# Patient Record
Sex: MALE | Race: OTHER | HISPANIC OR LATINO | URBAN - METROPOLITAN AREA
[De-identification: names, ages, dates, MRNs, and addresses within clinical notes are randomized per-mention and may not be internally consistent; named-entity substitution may affect disease eponyms.]

---

## 2018-02-11 ENCOUNTER — EMERGENCY (EMERGENCY)
Facility: HOSPITAL | Age: 23
LOS: 1 days | Discharge: HOME | End: 2018-02-11
Attending: EMERGENCY MEDICINE
Payer: MEDICAID

## 2018-02-11 VITALS — HEIGHT: 68 IN | WEIGHT: 179.9 LBS

## 2018-02-11 VITALS — HEART RATE: 98 BPM

## 2018-02-11 LAB
ALBUMIN SERPL ELPH-MCNC: 4.1 G/DL — SIGNIFICANT CHANGE UP (ref 3.3–5.2)
ALP SERPL-CCNC: 97 U/L — SIGNIFICANT CHANGE UP (ref 40–120)
ALT FLD-CCNC: 33 U/L — SIGNIFICANT CHANGE UP
ANION GAP SERPL CALC-SCNC: 14 MMOL/L — SIGNIFICANT CHANGE UP (ref 5–17)
AST SERPL-CCNC: 21 U/L — SIGNIFICANT CHANGE UP
BASOPHILS # BLD AUTO: 0 K/UL — SIGNIFICANT CHANGE UP (ref 0–0.2)
BASOPHILS NFR BLD AUTO: 0.3 % — SIGNIFICANT CHANGE UP (ref 0–2)
BILIRUB SERPL-MCNC: 0.3 MG/DL — LOW (ref 0.4–2)
BUN SERPL-MCNC: 13 MG/DL — SIGNIFICANT CHANGE UP (ref 8–20)
CALCIUM SERPL-MCNC: 9.2 MG/DL — SIGNIFICANT CHANGE UP (ref 8.6–10.2)
CHLORIDE SERPL-SCNC: 100 MMOL/L — SIGNIFICANT CHANGE UP (ref 98–107)
CO2 SERPL-SCNC: 24 MMOL/L — SIGNIFICANT CHANGE UP (ref 22–29)
CREAT SERPL-MCNC: 0.95 MG/DL — SIGNIFICANT CHANGE UP (ref 0.5–1.3)
EOSINOPHIL # BLD AUTO: 1.5 K/UL — HIGH (ref 0–0.5)
EOSINOPHIL NFR BLD AUTO: 9.9 % — HIGH (ref 0–6)
GLUCOSE SERPL-MCNC: 100 MG/DL — SIGNIFICANT CHANGE UP (ref 70–115)
HCT VFR BLD CALC: 41.6 % — LOW (ref 42–52)
HGB BLD-MCNC: 14.2 G/DL — SIGNIFICANT CHANGE UP (ref 14–18)
LACTATE BLDV-MCNC: 0.7 MMOL/L — SIGNIFICANT CHANGE UP (ref 0.5–2)
LYMPHOCYTES # BLD AUTO: 21.4 % — SIGNIFICANT CHANGE UP (ref 20–55)
LYMPHOCYTES # BLD AUTO: 3.3 K/UL — SIGNIFICANT CHANGE UP (ref 1–4.8)
MCHC RBC-ENTMCNC: 29.5 PG — SIGNIFICANT CHANGE UP (ref 27–31)
MCHC RBC-ENTMCNC: 34.1 G/DL — SIGNIFICANT CHANGE UP (ref 32–36)
MCV RBC AUTO: 86.5 FL — SIGNIFICANT CHANGE UP (ref 80–94)
MONOCYTES # BLD AUTO: 1.3 K/UL — HIGH (ref 0–0.8)
MONOCYTES NFR BLD AUTO: 8.6 % — SIGNIFICANT CHANGE UP (ref 3–10)
NEUTROPHILS # BLD AUTO: 9.2 K/UL — HIGH (ref 1.8–8)
NEUTROPHILS NFR BLD AUTO: 59.6 % — SIGNIFICANT CHANGE UP (ref 37–73)
PLATELET # BLD AUTO: 267 K/UL — SIGNIFICANT CHANGE UP (ref 150–400)
POTASSIUM SERPL-MCNC: 3.9 MMOL/L — SIGNIFICANT CHANGE UP (ref 3.5–5.3)
POTASSIUM SERPL-SCNC: 3.9 MMOL/L — SIGNIFICANT CHANGE UP (ref 3.5–5.3)
PROT SERPL-MCNC: 7.9 G/DL — SIGNIFICANT CHANGE UP (ref 6.6–8.7)
RBC # BLD: 4.81 M/UL — SIGNIFICANT CHANGE UP (ref 4.6–6.2)
RBC # FLD: 12.6 % — SIGNIFICANT CHANGE UP (ref 11–15.6)
SODIUM SERPL-SCNC: 138 MMOL/L — SIGNIFICANT CHANGE UP (ref 135–145)
WBC # BLD: 15.5 K/UL — HIGH (ref 4.8–10.8)
WBC # FLD AUTO: 15.5 K/UL — HIGH (ref 4.8–10.8)

## 2018-02-11 PROCEDURE — 80053 COMPREHEN METABOLIC PANEL: CPT

## 2018-02-11 PROCEDURE — 36415 COLL VENOUS BLD VENIPUNCTURE: CPT

## 2018-02-11 PROCEDURE — 10080 I&D PILONIDAL CYST SIMPLE: CPT

## 2018-02-11 PROCEDURE — 99284 EMERGENCY DEPT VISIT MOD MDM: CPT | Mod: 25

## 2018-02-11 PROCEDURE — 85027 COMPLETE CBC AUTOMATED: CPT

## 2018-02-11 PROCEDURE — 83605 ASSAY OF LACTIC ACID: CPT

## 2018-02-11 RX ORDER — IBUPROFEN 200 MG
1 TABLET ORAL
Qty: 20 | Refills: 0 | OUTPATIENT
Start: 2018-02-11 | End: 2018-02-15

## 2018-02-11 RX ORDER — AZTREONAM 2 G
1 VIAL (EA) INJECTION
Qty: 10 | Refills: 0 | OUTPATIENT
Start: 2018-02-11 | End: 2018-02-15

## 2018-02-11 RX ORDER — OXYCODONE AND ACETAMINOPHEN 5; 325 MG/1; MG/1
1 TABLET ORAL ONCE
Qty: 0 | Refills: 0 | Status: DISCONTINUED | OUTPATIENT
Start: 2018-02-11 | End: 2018-02-11

## 2018-02-11 RX ORDER — CEPHALEXIN 500 MG
1 CAPSULE ORAL
Qty: 20 | Refills: 0 | OUTPATIENT
Start: 2018-02-11 | End: 2018-02-15

## 2018-02-11 RX ORDER — CEPHALEXIN 500 MG
500 CAPSULE ORAL ONCE
Qty: 0 | Refills: 0 | Status: COMPLETED | OUTPATIENT
Start: 2018-02-11 | End: 2018-02-11

## 2018-02-11 RX ADMIN — Medication 500 MILLIGRAM(S): at 20:56

## 2018-02-11 RX ADMIN — OXYCODONE AND ACETAMINOPHEN 1 TABLET(S): 5; 325 TABLET ORAL at 20:56

## 2018-02-11 RX ADMIN — Medication 1 TABLET(S): at 20:56

## 2018-02-11 NOTE — ED PROVIDER NOTE - OBJECTIVE STATEMENT
21 y/o M pt with no pertinent pmhx BIB parents to the ED c/o bilateral pain in the gluteal cleft. Localizes the pain just superior to the rectum. Progressively worsening x4 days. Reports fever 3 days ago, resolved now. Pain exacerbated when he sits down. Has not taken anything for the pain and has not used anything for the issue. Denies chills, cough, abdominal pain, headache, n/v/d/c, change in bowel movements, bowel/bladder incontinence, urinary symptoms, chest pain, sob, back pain, anxiety, blurred vision, sinus congestion, recent travel, sick contacts, recent ABx, STD exposure, hematuria, recent trauma or any other complaints. NKDA.

## 2018-02-11 NOTE — ED ADULT NURSE REASSESSMENT NOTE - NS ED NURSE REASSESS COMMENT FT1
Pt s/p I& D performed by md contreras and tolerated well. PT comfortable with DC, and medicated. Pt states he is not driving home.

## 2018-02-11 NOTE — ED PROVIDER NOTE - SKIN AREA #1
GLUTEAL CLEFT, SUPERIOR TO THE RECTUM WITH EXTENSIO TO BOTH CHEEKS. SWELLING AND TENDERNESS TO PALPATION.

## 2018-02-11 NOTE — ED PROVIDER NOTE - PROGRESS NOTE DETAILS
PA NOTE: PT seen resting comfortably in no acute distress, no acute complaints at this time, PT tolerating PO intake,  PT informed of all results, PT states he is feeling better PT will be DC home with abx, pain medication, follow up to sx clilninc, educated about when to return to the ED if needed. PT verbalizes that he understands all instructions and results.

## 2018-02-11 NOTE — ED PROVIDER NOTE - ATTENDING CONTRIBUTION TO CARE
I, Oscar Dominguez, performed the initial face to face bedside interview with this patient regarding history of present illness, review of symptoms and relevant past medical, social and family history.  I completed an independent physical examination.  I was the initial provider who evaluated this patient.  The history, relevant review of systems, past medical and surgical history, medical decision making, and physical examination was documented by the scribe in my presence and I attest to the accuracy of the documentation.  I have signed out the follow up of any pending tests (i.e. labs, radiological studies) to the ACP.  I have communicated the patient’s plan of care and disposition with the ACP. I, Oscar Dominguez, performed the initial face to face bedside interview with this patient regarding history of present illness, review of symptoms and relevant past medical, social and family history.  I completed an independent physical examination.  I was the initial provider who evaluated this patient.  The history, relevant review of systems, past medical and surgical history, medical decision making, and physical examination was documented by the scribe in my presence and I attest to the accuracy of the documentation.  I have signed out the follow up of any pending tests (i.e. labs, radiological studies) to the ACP.  I have communicated the patient’s plan of care and disposition with the ACP.  gen in nad respo clear cardiac nomurmur skin + left gluteal abscess no surrounding cellultitus

## 2022-03-29 NOTE — ED PROVIDER NOTE - NS_EDPROVIDERDISPOUSERTYPE_ED_A_ED
Good postoperative appearance. Scribe Attestation (For Scribes USE Only)... Attending Attestation (For Attendings USE Only).../Scribe Attestation (For Scribes USE Only)...